# Patient Record
Sex: MALE | Race: BLACK OR AFRICAN AMERICAN
[De-identification: names, ages, dates, MRNs, and addresses within clinical notes are randomized per-mention and may not be internally consistent; named-entity substitution may affect disease eponyms.]

---

## 2019-03-18 ENCOUNTER — HOSPITAL ENCOUNTER (EMERGENCY)
Dept: HOSPITAL 74 - JER | Age: 24
LOS: 1 days | Discharge: HOME | End: 2019-03-19
Payer: COMMERCIAL

## 2019-03-18 VITALS — BODY MASS INDEX: 31.2 KG/M2

## 2019-03-18 VITALS — SYSTOLIC BLOOD PRESSURE: 134 MMHG | HEART RATE: 85 BPM | DIASTOLIC BLOOD PRESSURE: 65 MMHG | TEMPERATURE: 98.9 F
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
## 2019-03-18 DIAGNOSIS — S93.432A: Primary | ICD-10-CM
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
## 2019-03-18 PROCEDURE — 2W3RX1Z IMMOBILIZATION OF LEFT LOWER LEG USING SPLINT: ICD-10-PCS

## 2019-03-19 NOTE — PDOC
History of Present Illness





- General


Chief Complaint: Injury


Stated Complaint: INJURY TO LFT ANKLE


Time Seen by Provider: 03/19/19 01:09


History Source: Patient





- History of Present Illness


Initial Comments: 





03/19/19 02:18


23 YEAR OLD MALE C.O LEFT ANKLE PAIN AFTER BEING KICKED IN TH E BACK OF FOOT 

WHILE PLAYING BASKETBALL. ARABELLA TRAUMA AND INJURY. 





Past History





- Past Medical History


Allergies/Adverse Reactions: 


 Allergies











Allergy/AdvReac Type Severity Reaction Status Date / Time


 


No Known Allergies Allergy   Verified 03/18/19 22:57











Home Medications: 


Ambulatory Orders





No Home Medications 0 dose .ROUTE UTDICT 09/15/12 








COPD: No





- Suicide/Smoking/Psychosocial Hx


Smoking Status: No


Smoking History: Never smoked


Number of Cigarettes Smoked Daily: 0





**Review of Systems





- Review of Systems


Able to Perform ROS?: Yes


Is the patient limited English proficient: No


Musculoskeletal: Yes: Other (ankle pain)





*Physical Exam





- Vital Signs


 Last Vital Signs











Temp Pulse Resp BP Pulse Ox


 


 98.9 F   85   18   134/65   100 


 


 03/18/19 22:57  03/18/19 22:57  03/18/19 22:57  03/18/19 22:57  03/18/19 22:57














- Physical Exam


General Appearance: Yes: Appropriately Dressed


Musculoskeletal: positive: Other (left ankle pain with rom. no deformity)


Extremity: positive: Normal Capillary Refill, Normal Inspection, Normal Range 

of Motion


Integumentary: positive: Normal Color, Dry, Warm





Moderate Sedation





- Procedure Monitoring


Vital Signs: 


Procedure Monitoring Vital Signs











Temperature  98.9 F   03/18/19 22:57


 


Pulse Rate  85   03/18/19 22:57


 


Respiratory Rate  18   03/18/19 22:57


 


Blood Pressure  134/65   03/18/19 22:57


 


O2 Sat by Pulse Oximetry (%)  100   03/18/19 22:57











Progress Note





- Progress Note


Progress Note: 





A: ankle sprain





P: rice








ortho follow 


aircast





*DC/Admit/Observation/Transfer


Diagnosis at time of Disposition: 


Ankle pain


Qualifiers:


 Chronicity: acute Laterality: left Qualified Code(s): M25.572 - Pain in left 

ankle and joints of left foot





High ankle sprain


Qualifiers:


 Encounter type: initial encounter Laterality: left Qualified Code(s): S93.432A 

- Sprain of tibiofibular ligament of left ankle, initial encounter








- Discharge Dispostion


Disposition: HOME





- Referrals


Referrals: 


Abel Tidwell MD [Primary Care Provider] - 


Anant Vera MD [Staff Physician] - 





- Patient Instructions


Printed Discharge Instructions:  DI for Ankle Sprain


Additional Instructions: 


rest ice elevate








keep ankle splint on for comfort. 








follow up with an orthopedic doctor in 1 week . 














- Post Discharge Activity


Forms/Work/School Notes:  Back to Work

## 2019-03-19 NOTE — PDOC
*Physical Exam





- Vital Signs


 Last Vital Signs











Temp Pulse Resp BP Pulse Ox


 


 98.9 F   85   18   134/65   100 


 


 03/18/19 22:57  03/18/19 22:57  03/18/19 22:57  03/18/19 22:57  03/18/19 22:57














Medical Decision Making





- Medical Decision Making





03/19/19 01:33


Patient seen by the advanced practice provider under my direct supervision. 

Ancillary testing reviewed as necessary.


I agree with plan as outlined by the advanced practice provider.





*DC/Admit/Observation/Transfer


Diagnosis at time of Disposition: 


 Ankle pain








- Referrals


Referrals: 


Abel Tidwell MD [Primary Care Provider] - 





- Patient Instructions





- Post Discharge Activity